# Patient Record
Sex: FEMALE | Race: NATIVE HAWAIIAN OR OTHER PACIFIC ISLANDER | Employment: FULL TIME | URBAN - METROPOLITAN AREA
[De-identification: names, ages, dates, MRNs, and addresses within clinical notes are randomized per-mention and may not be internally consistent; named-entity substitution may affect disease eponyms.]

---

## 2019-07-11 ENCOUNTER — TELEPHONE (OUTPATIENT)
Dept: NURSING | Facility: CLINIC | Age: 34
End: 2019-07-11

## 2019-07-11 ENCOUNTER — OFFICE VISIT (OUTPATIENT)
Dept: URGENT CARE | Facility: URGENT CARE | Age: 34
End: 2019-07-11
Payer: COMMERCIAL

## 2019-07-11 VITALS
SYSTOLIC BLOOD PRESSURE: 129 MMHG | HEART RATE: 70 BPM | RESPIRATION RATE: 16 BRPM | DIASTOLIC BLOOD PRESSURE: 81 MMHG | OXYGEN SATURATION: 98 % | TEMPERATURE: 98.2 F | WEIGHT: 134 LBS

## 2019-07-11 DIAGNOSIS — T18.108D FOREIGN BODY IN ESOPHAGUS, SUBSEQUENT ENCOUNTER: Primary | ICD-10-CM

## 2019-07-11 PROCEDURE — 99203 OFFICE O/P NEW LOW 30 MIN: CPT | Performed by: FAMILY MEDICINE

## 2019-07-11 NOTE — PROGRESS NOTES
"SUBJECTIVE:   Chief Complaint   Patient presents with     Urgent Care     Throat Problem     Pt states that on Monday she ate fish in soup and since then she has been feeling \"fish bones\" on her throat            Concern: Throat concerns   Description of the problem :Patient complained of fish bone getting stuck in her throat for the past three days. She reports eating a fish soup and has since had a sensation of bone stuck in her throat. She denies any nausea, vomiting, hematemesis, coughing, dysphagia and odynophagia.  She has been drinking lots of water without relief.          Review of Systems review of system negative except as mentioned in HPI.       History reviewed. No pertinent past medical history.  History reviewed. No pertinent family history.  No current outpatient medications on file.     Social History     Tobacco Use     Smoking status: Never Smoker     Smokeless tobacco: Never Used   Substance Use Topics     Alcohol use: Not on file       OBJECTIVE  /81   Pulse 70   Temp 98.2  F (36.8  C) (Oral)   Resp 16   Wt 60.8 kg (134 lb)   LMP  (LMP Unknown)   SpO2 98%     Physical Exam   Constitutional: She appears well-developed and well-nourished. No distress.   HENT:   Head: Normocephalic and atraumatic.   Nose: Nose normal.   Mouth/Throat: Oropharynx is clear and moist. No oropharyngeal exudate.   Eyes: Conjunctivae are normal.   Neurological: She is alert.   Skin: She is not diaphoretic. No erythema.   Psychiatric: She has a normal mood and affect.       Labs:  No results found for this or any previous visit (from the past 24 hour(s)).    X-Ray was not done.    ASSESSMENT:    Shiv was seen today for urgent care and throat problem.    Diagnoses and all orders for this visit:    Foreign body in esophagus, subsequent encounter:  Reassured patient that bone will likely pass. Discussed drinking lots of water in the next few days. She can call and follow up with GI if symptoms persists or becomes " unbearable. Refer to AVS for further instructions.     -     GASTROENTEROLOGY ADULT REF PROCEDURE ONLY Calli Cantrell [facility] [phone]; [facility] GI          Followup:    If not improving or if condition worsens, follow up with your Primary Care Provider    Kaylen Pacheco MD

## 2019-07-11 NOTE — TELEPHONE ENCOUNTER
" calling\" My wife was seen today and we need to make an appt with GI.\" per epic\"     GASTROENTEROLOGY ADULT REF PROCEDURE ONLY Calli Cantrell [facility] [phone]; [facility] GI   Advised to call in am when open.  Shana Poole RN [facility]      "

## 2019-07-11 NOTE — PATIENT INSTRUCTIONS
Patient Education     Esophageal Blockage, Resolved  The esophagus is the passage that carries food from the mouth to the stomach. You had a blockage in the esophagus. This can happen after swallowing a large piece of food, taking a large pill, or swallowing foreign objects.  If this is a recurring problem, it can be a sign of disease in the esophagus, such as inflammation (swelling and irritation) or scarring. If you did not have a special procedure (endoscopy) today to treat your condition, further testing will be needed to evaluate this problem.  The blockage has cleared. You should be able to swallow normally again.  Home care    For the next 24 hours you may drink liquids and eat soft foods.    You may have been given medicine today to prevent pain and help you relax. If so, you may feel drowsy for the next 4 to 12 hours. Do not drive or operate dangerous equipment until you feel alert again.    If your esophagus was blocked by food, be sure to cut solid food into small pieces before putting it into your mouth. Chew all foods well before swallowing.    If your esophagus was blocked by an over-the-counter pill (such as a vitamin), avoid this size pill in the future. If it was blocked by a prescription medicine, ask your healthcare provider for another form of medicine.  Follow-up care  Follow up with your healthcare provider, or as advised. If you continue to have problems, contact your doctor or this facility for advice. If this is a recurring problem, talk with your healthcare provider about it. He or she may suggest having an endoscopy. This is a look in the esophagus with a small camera and light in a narrow, flexible tube.  When to seek medical advice  Call your healthcare provider right away if any of these occur:    Unable to swallow    Significant pain on swallowing    Fever of 100.4 F (38 C) or higher, or as directed by your healthcare provider  Call 911  Call 911 if any of the following  occur:     Chest pain or shortness of breath    Vomiting blood (red or black)    Blood in your stool (dark red or black color)   Date Last Reviewed: 5/1/2017 2000-2018 [employer]. [address] [place] [address], [place] [address]. All rights reserved. This information is not intended as a substitute for professional medical care. Always follow your healthcare professional's instructions.

## 2019-07-15 ENCOUNTER — HOSPITAL ENCOUNTER (EMERGENCY)
Facility: CLINIC | Age: 34
Discharge: HOME | End: 2019-07-15
Admitting: PHYSICIAN ASSISTANT
Payer: COMMERCIAL

## 2019-07-15 ENCOUNTER — APPOINTMENT (OUTPATIENT)
Dept: GENERAL RADIOLOGY | Facility: CLINIC | Age: 34
End: 2019-07-15
Attending: PHYSICIAN ASSISTANT
Payer: COMMERCIAL

## 2019-07-15 VITALS
RESPIRATION RATE: 18 BRPM | HEART RATE: 75 BPM | HEIGHT: 61 IN | BODY MASS INDEX: 25.3 KG/M2 | SYSTOLIC BLOOD PRESSURE: 112 MMHG | WEIGHT: 134 LBS | TEMPERATURE: 97.7 F | OXYGEN SATURATION: 97 % | DIASTOLIC BLOOD PRESSURE: 73 MMHG

## 2019-07-15 DIAGNOSIS — Z87.821 HISTORY OF SWALLOWED FOREIGN BODY: ICD-10-CM

## 2019-07-15 DIAGNOSIS — R07.0 THROAT PAIN: ICD-10-CM

## 2019-07-15 PROCEDURE — 99283 EMERGENCY DEPT VISIT LOW MDM: CPT

## 2019-07-15 PROCEDURE — 70360 X-RAY EXAM OF NECK: CPT

## 2019-07-15 SDOH — HEALTH STABILITY: MENTAL HEALTH: HOW OFTEN DO YOU HAVE A DRINK CONTAINING ALCOHOL?: NEVER

## 2019-07-15 ASSESSMENT — ENCOUNTER SYMPTOMS
VOMITING: 0
COUGH: 0
TROUBLE SWALLOWING: 1
NAUSEA: 0
SORE THROAT: 1
SHORTNESS OF BREATH: 0

## 2019-07-15 ASSESSMENT — MIFFLIN-ST. JEOR: SCORE: 1250.2

## 2019-07-15 NOTE — ED PROVIDER NOTES
History     Chief Complaint:  Swallowed Foreign Body    JOSUE Kemp is a 33 year old female who presents with her  to the ED for the evaluation of swallowed foreign body. The patient reports that one week ago she ate fish and felt a bone get stuck in her throat. The patient states that she was seen in urgent care for this on 7/11/2019, was told to push fluids, and was discharged to home with possible follow up with GI if her symptoms persist. The patient notes that after her visit to urgent care her throat discomfort became relieved on its own, but states that it returned today, prompting her to the ED. She also notes that she has an esophagogastroduodenoscopy scheduled for the 24th of this month, but states that with the return of her throat discomfort today she wanted to be evaluated sooner. She describes that the throat discomfort that returned today is located in the same place as it was when she initially ate the fish and that she is also experiencing pain with swallowing. The patient denies difficulty breathing, nausea, vomiting, hematemesis, coughing, dysphagia, and other issues.      Allergies:  No Known Drug Allergies     Medications:    The patient is currently on no regular medications.     Past Medical History:    History reviewed. No pertinent past medical history.    Past Surgical History:    History reviewed. No pertinent surgical history.    Family History:    History reviewed. No pertinent family history.     Social History:  The patient was accompanied to the ED by .  Smoking Status: Never Smoker  Smokeless Tobacco: No  Alcohol Use: No  Marital Status:        Review of Systems   HENT: Positive for sore throat and trouble swallowing.    Respiratory: Negative for cough and shortness of breath.    Gastrointestinal: Negative for nausea and vomiting.   All other systems reviewed and are negative.    Physical Exam     Patient Vitals for the past 24 hrs:   BP Temp Temp src Pulse  "Resp SpO2 Height Weight   07/15/19 1612 112/73 97.7  F (36.5  C) Oral 75 18 97 % 1.549 m (5' 1\") 60.8 kg (134 lb)      Physical Exam  General: Well appearing, well nourished. Normal mood and affect.  Skin: Good turgor, no rash, no unusual bruising or prominent lesions.  HEENT: Head: Normocephalic, atraumatic, no visible masses.   Eyes: Conjunctiva clear.  Throat/pharynx: Mucous membranes moist, no mucosal lesions.   Cardiac: Normal rate and regular rhythm, no murmur or gallop.   Lungs: Clear to auscultation.  Abdomen: Abdomen soft, non-tender. No rebound tenderness of guarding.   Musculoskeletal: Normal gait and station.   Neurologic: Oriented x 3. GCS: 15.  Psychiatric: Intact recent and remote memory, judgment and insight, normal mood and affect.     Emergency Department Course   Imaging:  Radiographic findings were communicated with the patient and family who voiced understanding of the findings.  Neck Soft Tissue XR  IMPRESSION: Unremarkable. No radiopaque foreign body is identified.  As read by Radiology.     Emergency Department Course:  Past medical records, nursing notes, and vitals reviewed.  1618: I performed an exam of the patient and obtained history, as documented above.     The patient was sent for a neck soft tissue x ray while in the emergency department, findings above.    1652: I rechecked the patient.  Findings and plan explained to the Patient and spouse. Patient discharged home with instructions regarding supportive care, medications, and reasons to return. The importance of close follow-up was reviewed.      Impression & Plan    Medical Decision Making:  Shiv Kemp is a 33 year old female who presents for evaluation of persistent discomfort after swallowing a chicken bone.  Details of the patient's history can be noted in the HPI.  On my exam, patient is vitally stable, appears well.  No difficulty breathing or swallowing.  X-ray soft tissue neck was completed.  This returned showing no evidence " of foreign body.  We discussed that the patient likely has a scratch throat/irritation from swallowing the bone.  She does already have an upper endoscopy scheduled.  I do not feel that she needs an emergent scope as she is having minimal pain, and no significant difficulty breathing/swallowing.  I advised continuing increasing fluids, hot tea with honey, Tylenol, ibuprofen, and to follow-up with gastroenterology at her scheduled appointment.  She return for changing worsening symptoms, worsening pain, difficulty breathing, significant difficulty eating, new concerns.  All questions were answered prior to discharge.  She and her  were in agreement with the treatment plan as stated above.    Diagnosis:    ICD-10-CM    1. History of swallowed foreign body Z87.821    2. Throat pain R07.0        Disposition:  discharged to home    Scribe Disclosure:  I, Oc Gomez, am serving as a scribe at 4:28 PM on 7/15/2019 to document services personally performed by Renetta Muhammad PA-C based on my observations and the provider's statements to me.      Oc Gomez  7/15/2019    EMERGENCY DEPARTMENT    This was created at least in part with a voice recognition software. Mistakes/typos may be present.      Renetta Muhammad PA  07/15/19 4018

## 2019-07-15 NOTE — DISCHARGE INSTRUCTIONS
Keep your appointment with the specialist on the 24th to have the upper endoscopy completed.  Continue drinking fluids, hot teas, honey, Tylenol, ibuprofen at home.  Return for difficulty breathing or swallowing.

## 2019-07-15 NOTE — ED AVS SNAPSHOT
Emergency Department  [address] [place] [id number]  Phone:  [phone]  Fax:  [phone]                                    Shiv Kemp   MRN: [id number]    Department:   Emergency Department   Date of Visit:  7/15/2019           After Visit Summary Signature Page    I have received my discharge instructions, and my questions have been answered. I have discussed any challenges I see with this plan with the nurse or doctor.    ..........................................................................................................................................  Patient/Patient Representative Signature      ..........................................................................................................................................  Patient Representative Print Name and Relationship to Patient    ..................................................               ................................................  Date                                   Time    ..........................................................................................................................................  Reviewed by Signature/Title    ...................................................              ..............................................  Date                                               Time          22EPIC Rev 08/18

## 2019-07-17 ENCOUNTER — HOSPITAL ENCOUNTER (OUTPATIENT)
Facility: AMBULATORY SURGERY CENTER | Age: 34
Discharge: HOME | End: 2019-07-17
Attending: SPECIALIST | Admitting: SPECIALIST
Payer: COMMERCIAL

## 2019-07-17 VITALS
TEMPERATURE: 98 F | RESPIRATION RATE: 18 BRPM | HEART RATE: 92 BPM | SYSTOLIC BLOOD PRESSURE: 102 MMHG | DIASTOLIC BLOOD PRESSURE: 66 MMHG | OXYGEN SATURATION: 97 %

## 2019-07-17 LAB — UPPER GI ENDOSCOPY: NORMAL

## 2019-07-17 PROCEDURE — G8907 PT DOC NO EVENTS ON DISCHARG: HCPCS

## 2019-07-17 PROCEDURE — 43235 EGD DIAGNOSTIC BRUSH WASH: CPT

## 2019-07-17 PROCEDURE — G8918 PT W/O PREOP ORDER IV AB PRO: HCPCS

## 2019-07-17 RX ORDER — LIDOCAINE 40 MG/G
CREAM TOPICAL
Status: DISCONTINUED | OUTPATIENT
Start: 2019-07-17 | End: 2019-07-18 | Stop reason: HOSPADM

## 2019-07-17 RX ORDER — FENTANYL CITRATE 50 UG/ML
INJECTION, SOLUTION INTRAMUSCULAR; INTRAVENOUS PRN
Status: DISCONTINUED | OUTPATIENT
Start: 2019-07-17 | End: 2019-07-17 | Stop reason: HOSPADM

## 2019-07-17 RX ORDER — ONDANSETRON 2 MG/ML
4 INJECTION INTRAMUSCULAR; INTRAVENOUS
Status: COMPLETED | OUTPATIENT
Start: 2019-07-17 | End: 2019-07-17

## 2019-07-17 RX ADMIN — ONDANSETRON 4 MG: 2 INJECTION INTRAMUSCULAR; INTRAVENOUS at 10:34

## 2019-07-17 NOTE — H&P
"Pre-Endoscopy History and Physical     Shiv Kemp MRN# [id number]   YOB: 1985 Age: 33 year old     Date of Procedure: 7/17/2019  Primary care provider: No Ref-Primary, Physician  Type of Endoscopy: Gastroscopy with possible biopsy, possible dilation, foreign body removal   Reason for Procedure: foreign body sensation since eating fish soup  Type of Anesthesia Anticipated: Conscious Sedation    HPI:    Shiv is a 33 year old female who will be undergoing the above procedure.      A history and physical has been performed. The patient's medications and allergies have been reviewed. The risks and benefits of the procedure and the sedation options and risks were discussed with the patient.  All questions were answered and informed consent was obtained.      She denies a personal or family history of anesthesia complications or bleeding disorders.     There is no problem list on file for this patient.       History reviewed. No pertinent past medical history.     History reviewed. No pertinent surgical history.    Social History     Tobacco Use     Smoking status: Never Smoker     Smokeless tobacco: Never Used   Substance Use Topics     Alcohol use: Never     Frequency: Never       History reviewed. No pertinent family history.    Prior to Admission medications    Not on File       No Known Allergies     REVIEW OF SYSTEMS:   5 point ROS negative except as noted above in HPI, including Gen., Resp., CV, GI &  system review.    PHYSICAL EXAM:   /72   Pulse 78   Temp 98  F (36.7  C) (Temporal)   Resp 16   LMP  (LMP Unknown)   SpO2 97%  Estimated body mass index is 25.32 kg/m  as calculated from the following:    Height as of 7/15/19: 1.549 m (5' 1\").    Weight as of 7/15/19: 60.8 kg (134 lb).   GENERAL APPEARANCE: alert, and oriented  MENTAL STATUS: alert  AIRWAY EXAM: Mallampatti Class II (visualization of the soft palate, fauces, and uvula)  RESP: lungs clear to auscultation - no rales, rhonchi or " wheezes  CV: regular rates and rhythm  DIAGNOSTICS:    Not indicated    IMPRESSION   ASA Class 1 - Healthy patient, no medical problems    PLAN:   Plan for Gastroscopy with possible biopsy, possible dilation. We discussed the risks, benefits and alternatives and the patient wished to proceed.    The above has been forwarded to the consulting provider.      Signed Electronically by: Kj Finnegan  July 17, 2019

## (undated) DEVICE — SOL WATER IRRIG 1000ML BOTTLE 07139-09

## (undated) DEVICE — PREP CHLORAPREP 26ML TINTED ORANGE  260815

## (undated) RX ORDER — FENTANYL CITRATE 50 UG/ML
INJECTION, SOLUTION INTRAMUSCULAR; INTRAVENOUS
Status: DISPENSED
Start: 2019-07-17

## (undated) RX ORDER — ONDANSETRON 2 MG/ML
INJECTION INTRAMUSCULAR; INTRAVENOUS
Status: DISPENSED
Start: 2019-07-17